# Patient Record
Sex: MALE | Race: WHITE | Employment: FULL TIME | URBAN - METROPOLITAN AREA
[De-identification: names, ages, dates, MRNs, and addresses within clinical notes are randomized per-mention and may not be internally consistent; named-entity substitution may affect disease eponyms.]

---

## 2018-02-07 ENCOUNTER — TELEPHONE (OUTPATIENT)
Dept: FAMILY MEDICINE CLINIC | Age: 23
End: 2018-02-07

## 2018-02-07 RX ORDER — OSELTAMIVIR PHOSPHATE 75 MG/1
75 CAPSULE ORAL DAILY
Qty: 10 CAPSULE | Refills: 0 | Status: SHIPPED | OUTPATIENT
Start: 2018-02-07 | End: 2018-02-17

## 2021-08-19 ENCOUNTER — OFFICE VISIT (OUTPATIENT)
Dept: PRIMARY CARE CLINIC | Age: 26
End: 2021-08-19

## 2021-08-19 VITALS
HEART RATE: 78 BPM | BODY MASS INDEX: 28.58 KG/M2 | WEIGHT: 211 LBS | RESPIRATION RATE: 18 BRPM | DIASTOLIC BLOOD PRESSURE: 74 MMHG | SYSTOLIC BLOOD PRESSURE: 116 MMHG | OXYGEN SATURATION: 98 % | HEIGHT: 72 IN

## 2021-08-19 DIAGNOSIS — Z00.8 ENCOUNTER FOR BIOMETRIC SCREENING: Primary | ICD-10-CM

## 2021-08-19 LAB
ALBUMIN SERPL-MCNC: 4.7 G/DL (ref 3.5–5.1)
ALP BLD-CCNC: 53 U/L (ref 38–126)
ALT SERPL-CCNC: 14 U/L (ref 11–66)
ANION GAP SERPL CALCULATED.3IONS-SCNC: 13 MEQ/L (ref 8–16)
AST SERPL-CCNC: 20 U/L (ref 5–40)
BILIRUB SERPL-MCNC: 0.4 MG/DL (ref 0.3–1.2)
BUN BLDV-MCNC: 11 MG/DL (ref 7–22)
CALCIUM SERPL-MCNC: 9.4 MG/DL (ref 8.5–10.5)
CHLORIDE BLD-SCNC: 100 MEQ/L (ref 98–111)
CHOLESTEROL, TOTAL: 156 MG/DL (ref 100–199)
CO2: 25 MEQ/L (ref 23–33)
CREAT SERPL-MCNC: 1 MG/DL (ref 0.4–1.2)
GFR SERPL CREATININE-BSD FRML MDRD: 90 ML/MIN/1.73M2
GLUCOSE BLD-MCNC: 93 MG/DL (ref 70–108)
HDLC SERPL-MCNC: 54 MG/DL
LDL CHOLESTEROL CALCULATED: 94 MG/DL
POTASSIUM SERPL-SCNC: 4.2 MEQ/L (ref 3.5–5.2)
SODIUM BLD-SCNC: 138 MEQ/L (ref 135–145)
TOTAL PROTEIN: 7.6 G/DL (ref 6.1–8)
TRIGL SERPL-MCNC: 40 MG/DL (ref 0–199)

## 2021-08-19 ASSESSMENT — ENCOUNTER SYMPTOMS
NAUSEA: 0
CHEST TIGHTNESS: 0
CONSTIPATION: 0
DIARRHEA: 0
PHOTOPHOBIA: 0
ABDOMINAL PAIN: 0
SHORTNESS OF BREATH: 0

## 2021-08-19 NOTE — PATIENT INSTRUCTIONS
Patient Education        Well Visit, Ages 25 to 48: Care Instructions  Overview     Well visits can help you stay healthy. Your doctor has checked your overall health and may have suggested ways to take good care of yourself. Your doctor also may have recommended tests. At home, you can help prevent illness with healthy eating, regular exercise, and other steps. Follow-up care is a key part of your treatment and safety. Be sure to make and go to all appointments, and call your doctor if you are having problems. It's also a good idea to know your test results and keep a list of the medicines you take. How can you care for yourself at home? · Get screening tests that you and your doctor decide on. Screening helps find diseases before any symptoms appear. · Eat healthy foods. Choose fruits, vegetables, whole grains, protein, and low-fat dairy foods. Limit fat, especially saturated fat. Reduce salt in your diet. · Limit alcohol. If you are a man, have no more than 2 drinks a day or 14 drinks a week. If you are a woman, have no more than 1 drink a day or 7 drinks a week. · Get at least 30 minutes of physical activity on most days of the week. Walking is a good choice. You also may want to do other activities, such as running, swimming, cycling, or playing tennis or team sports. Discuss any changes in your exercise program with your doctor. · Reach and stay at a healthy weight. This will lower your risk for many problems, such as obesity, diabetes, heart disease, and high blood pressure. · Do not smoke or allow others to smoke around you. If you need help quitting, talk to your doctor about stop-smoking programs and medicines. These can increase your chances of quitting for good. · Care for your mental health. It is easy to get weighed down by worry and stress. Learn strategies to manage stress, like deep breathing and mindfulness, and stay connected with your family and community.  If you find you often feel sad or hopeless, talk with your doctor. Treatment can help. · Talk to your doctor about whether you have any risk factors for sexually transmitted infections (STIs). You can help prevent STIs if you wait to have sex with a new partner (or partners) until you've each been tested for STIs. It also helps if you use condoms (male or female condoms) and if you limit your sex partners to one person who only has sex with you. Vaccines are available for some STIs, such as HPV. · Use birth control if it's important to you to prevent pregnancy. Talk with your doctor about the choices available and what might be best for you. · If you think you may have a problem with alcohol or drug use, talk to your doctor. This includes prescription medicines (such as amphetamines and opioids) and illegal drugs (such as cocaine and methamphetamine). Your doctor can help you figure out what type of treatment is best for you. · Protect your skin from too much sun. When you're outdoors from 10 a.m. to 4 p.m., stay in the shade or cover up with clothing and a hat with a wide brim. Wear sunglasses that block UV rays. Even when it's cloudy, put broad-spectrum sunscreen (SPF 30 or higher) on any exposed skin. · See a dentist one or two times a year for checkups and to have your teeth cleaned. · Wear a seat belt in the car. When should you call for help? Watch closely for changes in your health, and be sure to contact your doctor if you have any problems or symptoms that concern you. Where can you learn more? Go to https://jake.healthRamen. org and sign in to your Profyle account. Enter P072 in the AdStage box to learn more about \"Well Visit, Ages 25 to 48: Care Instructions. \"     If you do not have an account, please click on the \"Sign Up Now\" link. Current as of: May 27, 2020               Content Version: 12.9  © 9806-0938 Healthwise, Incorporated. Care instructions adapted under license by [facility] ([facility]). If you have questions about a medical condition or this instruction, always ask your healthcare professional. [place] [address] any warranty or liability for your use of this information.

## 2021-08-19 NOTE — PROGRESS NOTES
[place]  [facility] [address] [facility]  Dept: [phone]  Loc: [phone]    Zenon Estrella (:  1995) is a 31 y.o. male,New patient, here for evaluation of the following chief complaint(s):  Wellness Program (biometric screening )      ASSESSMENT/PLAN:  1. Encounter for biometric screening  -     Comprehensive Metabolic Panel  -     Lipid Panel      Body mass index is 28.62 kg/m². Needs preventative:  Dental and vision screenings due      No follow-ups on file. SUBJECTIVE/OBJECTIVE:  alberto is here for employer wellness biometrics        Review of Systems   Constitutional: Negative for chills, fatigue and fever. HENT: Negative for congestion. Eyes: Negative for photophobia and visual disturbance. Respiratory: Negative for chest tightness and shortness of breath. Cardiovascular: Negative for chest pain, palpitations and leg swelling. Gastrointestinal: Negative for abdominal pain, constipation, diarrhea and nausea. Genitourinary: Negative for difficulty urinating. Skin: Negative for rash. Allergic/Immunologic: Negative for environmental allergies. Neurological: Negative for dizziness, weakness and numbness. Psychiatric/Behavioral: Negative for dysphoric mood. The patient is not nervous/anxious. Physical Exam  Constitutional:       Appearance: Normal appearance. HENT:      Nose: Nose normal.      Mouth/Throat:      Mouth: Mucous membranes are moist.   Cardiovascular:      Rate and Rhythm: Normal rate. Pulses: Normal pulses. Pulmonary:      Effort: Pulmonary effort is normal.   Abdominal:      Palpations: Abdomen is soft. Musculoskeletal:         General: Normal range of motion. Skin:     General: Skin is warm and dry. Capillary Refill: Capillary refill takes less than 2 seconds. Neurological:      Mental Status: He is alert.                Additional Information:    /74   Pulse 78   Resp 18   Ht 6' (1.829 m)   Wt 211 lb (95.7 kg)   SpO2 98%   BMI 28.62 kg/m²     An electronic signature was used to authenticate this note.     --Manolo Fillers, APRN - CNP

## 2022-06-22 ENCOUNTER — OFFICE VISIT (OUTPATIENT)
Dept: PRIMARY CARE CLINIC | Age: 27
End: 2022-06-22

## 2022-06-22 VITALS
RESPIRATION RATE: 18 BRPM | HEIGHT: 72 IN | WEIGHT: 208 LBS | BODY MASS INDEX: 28.17 KG/M2 | DIASTOLIC BLOOD PRESSURE: 70 MMHG | OXYGEN SATURATION: 98 % | SYSTOLIC BLOOD PRESSURE: 110 MMHG | HEART RATE: 78 BPM

## 2022-06-22 DIAGNOSIS — S80.11XA CONTUSION OF RIGHT LOWER LEG, INITIAL ENCOUNTER: ICD-10-CM

## 2022-06-22 DIAGNOSIS — S80.811A ABRASION, RIGHT LOWER LEG, INITIAL ENCOUNTER: Primary | ICD-10-CM

## 2022-06-22 RX ORDER — CEPHALEXIN 500 MG/1
500 CAPSULE ORAL 2 TIMES DAILY
Qty: 10 CAPSULE | Refills: 0 | Status: SHIPPED | OUTPATIENT
Start: 2022-06-22 | End: 2022-06-27

## 2022-06-22 ASSESSMENT — ENCOUNTER SYMPTOMS
RESPIRATORY NEGATIVE: 1
COLOR CHANGE: 1

## 2022-06-22 NOTE — PROGRESS NOTES
[address]  Dept: [phone]    Stacy Martini (:  1995) is a 31 y.o. male,Established patient, here for evaluation of the following chief complaint(s): Wound Check (here for wound to rigth lower leg, was playing wiffle ball on saturday and slid into third base )          ASSESSMENT/PLAN:  1. Abrasion, right lower leg, initial encounter  -     cephALEXin (KEFLEX) 500 MG capsule; Take 1 capsule by mouth 2 times daily for 5 days, Disp-10 capsule, R-0Normal  2. Contusion of right lower leg, initial encounter  -     cephALEXin (KEFLEX) 500 MG capsule; Take 1 capsule by mouth 2 times daily for 5 days, Disp-10 capsule, R-0Normal    Wound care instructed  See AVS  Wound check 2-3 days or sooner if needed. AROM to ankle WNL and Knee on examination  No ankle pain reported - Homans  Tenderness in the soft tissue    Return in about 2 days (around 2022), or if symptoms worsen or fail to improve. SUBJECTIVE/OBJECTIVE:  Thomas Norman presents with a wound to his RLE  He sustained from a slide and contact against the ground playing wiffle ball. He has open abrasion noted to the outer lateral surface of the RLE  He reports increases pain and swelling ofthis area awi th redness around the wound extending to ankle and some yellow drainage. Has been performing skin care at home with antibacterial soap and water  Covering with dressing and bacitracin while at work, leaving [facility] when at home  . Wound Check  He was originally treated 3 to 5 days ago. Previous treatment included wound cleansing or irrigation. The maximum temperature noted was less than 100.4 F. There has been colored discharge from the wound. The redness has worsened. The swelling has worsened. The pain has worsened. He has no difficulty moving the affected extremity or digit.        Review of Systems   Constitutional: Positive for fever.   Respiratory: Negative. Cardiovascular: Positive for leg swelling. Musculoskeletal: Positive for gait problem and myalgias. Negative for joint swelling. Skin: Positive for color change and wound. Negative for pallor and rash. Neurological: Negative for weakness and numbness. Physical Exam  Constitutional:       Appearance: Normal appearance. He is not ill-appearing. Cardiovascular:      Rate and Rhythm: Normal rate. Pulses: Normal pulses. Musculoskeletal:         General: Swelling and tenderness present. Right lower leg: Swelling and tenderness present. Right ankle: Swelling present. No tenderness. Normal range of motion. Left ankle: Normal.        Legs:       Comments: See images for erythema, open wound from abrasion   Skin:     General: Skin is warm. Findings: Erythema and lesion present. No bruising. Neurological:      Mental Status: He is alert. Additional Information:    /70   Pulse 78   Resp 18   Ht 6' (1.829 m)   Wt 208 lb (94.3 kg)   SpO2 98%   BMI 28.21 kg/m²     An electronic signature was used to authenticate this note.     --PHUC De La Cruz - CNP

## 2022-06-22 NOTE — PATIENT INSTRUCTIONS
Patient Education     keep covered clean and dry  Use the ointment to cover the open skin wound  Dressing change twice a day as directed  Wound care use the soap and saline rinse for dressing changes    Follow up for wound check 2-3 days or sooner if needed  Ice 20 minutes at least 3 x day  Elevate leg when able    Scrapes (Abrasions): Care Instructions  Overview  Scrapes (abrasions) are wounds where your skin has been rubbed or torn off. Most scrapes do not go deep into the skin, but some may remove several layersof skin. Scrapes usually don't bleed much, but they may ooze pinkish fluid. Scrapes on the head or face may appear worse than they are. They may bleed a lot becauseof the good blood supply to this area. Most scrapes heal well and may not need a bandage. They usually heal within 3 to 7 days. A large, deep scrape may take 1 to 2 weeks or longer to heal. A scabmay form on some scrapes. Follow-up care is a key part of your treatment and safety. Be sure to make and go to all appointments, and call your doctor if you are having problems. It's also a good idea to know your test results and keep alist of the medicines you take. How can you care for yourself at home?  If your doctor told you how to care for your wound, follow your doctor's instructions. If you did not get instructions, follow this general advice:  ? Wash the scrape with clean water 2 times a day. Don't use hydrogen peroxide or alcohol, which can slow healing. ? You may cover the scrape with a thin layer of petroleum jelly, such as Vaseline, and a nonstick bandage. ? Apply more petroleum jelly and replace the bandage as needed.  Prop up the injured area on a pillow anytime you sit or lie down during the next 3 days. Try to keep it above the level of your heart. This will help reduce swelling.  Be safe with medicines. Take pain medicines exactly as directed.   ? If the doctor gave you a prescription medicine for pain, take it as prescribed. ? If you are not taking a prescription pain medicine, ask your doctor if you can take an over-the-counter medicine. When should you call for help? Call your doctor now or seek immediate medical care if:     You have signs of infection, such as:  ? Increased pain, swelling, warmth, or redness around the scrape. ? Red streaks leading from the scrape. ? Pus draining from the scrape. ? A fever.      The scrape starts to bleed, and blood soaks through the bandage. Oozing small amounts of blood is normal.   Watch closely for changes in your health, and be sure to contact your doctor ifthe scrape is not getting better each day. Where can you learn more? Go to https://Ecrio.Baroc Pub. org and sign in to your ACKme Networks account. Enter A374 in the Oasys Water box to learn more about \"Scrapes (Abrasions): Care Instructions. \"     If you do not have an account, please click on the \"Sign Up Now\" link. Current as of: March 9, 2022               Content Version: 13.3  © 4678-5893 Healthwise, Incorporated. Care instructions adapted under license by [facility] ([facility]). If you have questions about a medical condition or this instruction, always ask your healthcare professional. [address]  any warranty or liability for your use of this information.

## 2022-06-24 ENCOUNTER — OFFICE VISIT (OUTPATIENT)
Dept: PRIMARY CARE CLINIC | Age: 27
End: 2022-06-24

## 2022-06-24 VITALS
HEART RATE: 78 BPM | BODY MASS INDEX: 28.17 KG/M2 | OXYGEN SATURATION: 98 % | WEIGHT: 208 LBS | RESPIRATION RATE: 16 BRPM | HEIGHT: 72 IN

## 2022-06-24 DIAGNOSIS — S80.811D ABRASION, RIGHT LOWER LEG, SUBSEQUENT ENCOUNTER: Primary | ICD-10-CM

## 2022-06-24 ASSESSMENT — ENCOUNTER SYMPTOMS
RESPIRATORY NEGATIVE: 1
COLOR CHANGE: 1

## 2022-06-24 NOTE — PROGRESS NOTES
swelling resolved   Musculoskeletal: Positive for myalgias. Negative for gait problem and joint swelling. Skin: Positive for color change and wound. Negative for pallor and rash. Neurological: Negative for weakness and numbness. Physical Exam  Constitutional:       Appearance: Normal appearance. He is not ill-appearing. Cardiovascular:      Rate and Rhythm: Normal rate. Pulses: Normal pulses. Musculoskeletal:         General: Tenderness present. No swelling. Right lower leg: Swelling and tenderness present. Right ankle: Swelling present. No tenderness. Normal range of motion. Left ankle: Normal.        Legs:       Comments: Healing skin abrasion RLE  Contusion symptoms improved. Skin:     General: Skin is warm. Findings: Erythema and lesion present. No bruising. Neurological:      Mental Status: He is alert. Additional Information:    Pulse 78   Resp 16   Ht 6' (1.829 m)   Wt 208 lb (94.3 kg)   SpO2 98%   BMI 28.21 kg/m²     An electronic signature was used to authenticate this note.     --PHUC Shaw - CNP

## 2025-02-05 ENCOUNTER — OFFICE VISIT (OUTPATIENT)
Dept: FAMILY MEDICINE CLINIC | Age: 30
End: 2025-02-05
Payer: COMMERCIAL

## 2025-02-05 ENCOUNTER — OFFICE VISIT (OUTPATIENT)
Dept: FAMILY MEDICINE CLINIC | Age: 30
End: 2025-02-05

## 2025-02-05 VITALS
HEART RATE: 111 BPM | OXYGEN SATURATION: 96 % | BODY MASS INDEX: 30.88 KG/M2 | RESPIRATION RATE: 16 BRPM | TEMPERATURE: 99.5 F | SYSTOLIC BLOOD PRESSURE: 124 MMHG | HEIGHT: 73 IN | DIASTOLIC BLOOD PRESSURE: 74 MMHG | WEIGHT: 233 LBS

## 2025-02-05 DIAGNOSIS — L50.9 HIVES: ICD-10-CM

## 2025-02-05 DIAGNOSIS — Z20.828 EXPOSURE TO INFLUENZA: Primary | ICD-10-CM

## 2025-02-05 PROCEDURE — 99203 OFFICE O/P NEW LOW 30 MIN: CPT | Performed by: NURSE PRACTITIONER

## 2025-02-05 RX ORDER — OSELTAMIVIR PHOSPHATE 75 MG/1
75 CAPSULE ORAL 2 TIMES DAILY
Qty: 20 CAPSULE | Refills: 0 | Status: SHIPPED | OUTPATIENT
Start: 2025-02-05 | End: 2025-02-05 | Stop reason: CLARIF

## 2025-02-05 RX ORDER — DEXTROMETHORPHAN HYDROBROMIDE AND PROMETHAZINE HYDROCHLORIDE 15; 6.25 MG/5ML; MG/5ML
5 SYRUP ORAL 4 TIMES DAILY PRN
Qty: 118 ML | Refills: 0 | Status: SHIPPED | OUTPATIENT
Start: 2025-02-05 | End: 2025-02-13

## 2025-02-05 RX ORDER — PREDNISONE 20 MG/1
20 TABLET ORAL 2 TIMES DAILY
Qty: 10 TABLET | Refills: 0 | Status: SHIPPED | OUTPATIENT
Start: 2025-02-05 | End: 2025-02-10

## 2025-02-05 RX ORDER — OSELTAMIVIR PHOSPHATE 75 MG/1
75 CAPSULE ORAL 2 TIMES DAILY
Qty: 10 CAPSULE | Refills: 0 | OUTPATIENT
Start: 2025-02-05 | End: 2025-02-10

## 2025-02-05 SDOH — ECONOMIC STABILITY: FOOD INSECURITY: WITHIN THE PAST 12 MONTHS, YOU WORRIED THAT YOUR FOOD WOULD RUN OUT BEFORE YOU GOT MONEY TO BUY MORE.: NEVER TRUE

## 2025-02-05 SDOH — ECONOMIC STABILITY: FOOD INSECURITY: WITHIN THE PAST 12 MONTHS, THE FOOD YOU BOUGHT JUST DIDN'T LAST AND YOU DIDN'T HAVE MONEY TO GET MORE.: NEVER TRUE

## 2025-02-05 ASSESSMENT — ENCOUNTER SYMPTOMS
NAUSEA: 0
ABDOMINAL PAIN: 0
DIARRHEA: 0
COUGH: 1
TROUBLE SWALLOWING: 0
BACK PAIN: 1
EYE DISCHARGE: 0
EYE REDNESS: 0
SHORTNESS OF BREATH: 0
ALLERGIC/IMMUNOLOGIC NEGATIVE: 1
RHINORRHEA: 1
WHEEZING: 0
SORE THROAT: 0
VOMITING: 0
EYE PAIN: 0
CONSTIPATION: 0

## 2025-02-05 ASSESSMENT — PATIENT HEALTH QUESTIONNAIRE - PHQ9
SUM OF ALL RESPONSES TO PHQ QUESTIONS 1-9: 0
SUM OF ALL RESPONSES TO PHQ9 QUESTIONS 1 & 2: 0
SUM OF ALL RESPONSES TO PHQ QUESTIONS 1-9: 0
2. FEELING DOWN, DEPRESSED OR HOPELESS: NOT AT ALL
SUM OF ALL RESPONSES TO PHQ QUESTIONS 1-9: 0
SUM OF ALL RESPONSES TO PHQ QUESTIONS 1-9: 0
1. LITTLE INTEREST OR PLEASURE IN DOING THINGS: NOT AT ALL

## 2025-02-05 NOTE — PROGRESS NOTES
[facility]  [facility]  [address] [address]  Dept: [phone]  Dept Fax: [phone]  Loc: [phone]     Visit Date:  2/5/2025      Patient:  Robert Martini  YOB: 1995    HPI:     Chief Complaint   Patient presents with    New Patient     Re-establish, last seen 2016    Rash     Torso, arms since this morning, kids were presumed flu a positive Monday, having drainage,     Letter for School/Work     History of Present Illness  The patient is a 29-year-old male who presents for evaluation of a rash.    He reports the onset of a small rash this morning, which has since spread to his abdomen and arms by lunchtime. The rash is mildly pruritic on his wrist but not on his torso. He does not experience any associated pain or sensitivity. The rash extends slightly into his groin area but is not sensitive to friction from clothing. He has not noticed any similar rashes in his family members. He has not introduced any new medications recently and has been using Tylenol to manage his fever. He recalls a recent trip to [place] a few weeks ago for a football game but reports no hunting activities or tick bites. He does not have a history of eczema and has not observed any lymph node swelling in his groin. He has been experiencing fatigue for the past few days, coinciding with the onset of his symptoms. He has had a fever peaking at 102 degrees since Saturday. He has not noticed any ear-related issues.    He has been experiencing persistent nasal drainage and a runny nose but does not have a sore throat. His most severe symptom is an incessant cough, which is causing headaches and back pain.    MEDICATIONS  Current: Tylenol      Rash  Associated symptoms include congestion, coughing, fatigue, a fever and rhinorrhea. Pertinent negatives include no diarrhea, eye pain, shortness of breath, sore throat or vomiting.       Medications    Current

## 2025-02-05 NOTE — PROGRESS NOTES
Shalonda pharmacist at [facility] called to clarify the tamiflu Rx. Rx was written for 10 days #20. Valeriano states it is to be for 5 days #10. V.O. given to Shalonda